# Patient Record
Sex: MALE | Race: BLACK OR AFRICAN AMERICAN | NOT HISPANIC OR LATINO | Employment: OTHER | URBAN - METROPOLITAN AREA
[De-identification: names, ages, dates, MRNs, and addresses within clinical notes are randomized per-mention and may not be internally consistent; named-entity substitution may affect disease eponyms.]

---

## 2022-02-14 ENCOUNTER — OFFICE VISIT (OUTPATIENT)
Dept: CARDIOLOGY | Facility: CLINIC | Age: 72
End: 2022-02-14
Payer: MEDICARE

## 2022-02-14 VITALS
OXYGEN SATURATION: 98 % | DIASTOLIC BLOOD PRESSURE: 84 MMHG | WEIGHT: 265 LBS | HEIGHT: 69 IN | SYSTOLIC BLOOD PRESSURE: 142 MMHG | HEART RATE: 74 BPM | RESPIRATION RATE: 16 BRPM | BODY MASS INDEX: 39.25 KG/M2

## 2022-02-14 DIAGNOSIS — E78.2 MIXED HYPERLIPIDEMIA: ICD-10-CM

## 2022-02-14 DIAGNOSIS — E66.9 OBESITY (BMI 35.0-39.9 WITHOUT COMORBIDITY): ICD-10-CM

## 2022-02-14 DIAGNOSIS — Z95.5 H/O HEART ARTERY STENT: ICD-10-CM

## 2022-02-14 DIAGNOSIS — I10 HYPERTENSION, UNSPECIFIED TYPE: Primary | ICD-10-CM

## 2022-02-14 PROCEDURE — 99213 OFFICE O/P EST LOW 20 MIN: CPT | Mod: S$GLB,,, | Performed by: GENERAL PRACTICE

## 2022-02-14 PROCEDURE — 93000 EKG 12-LEAD: ICD-10-PCS | Mod: S$GLB,,, | Performed by: GENERAL PRACTICE

## 2022-02-14 PROCEDURE — 93000 ELECTROCARDIOGRAM COMPLETE: CPT | Mod: S$GLB,,, | Performed by: GENERAL PRACTICE

## 2022-02-14 PROCEDURE — 99213 PR OFFICE/OUTPT VISIT, EST, LEVL III, 20-29 MIN: ICD-10-PCS | Mod: S$GLB,,, | Performed by: GENERAL PRACTICE

## 2022-02-14 RX ORDER — ZOLPIDEM TARTRATE 10 MG/1
5-10 TABLET ORAL NIGHTLY
COMMUNITY
Start: 2022-01-29

## 2022-02-14 RX ORDER — TIZANIDINE 4 MG/1
4 TABLET ORAL EVERY 8 HOURS PRN
COMMUNITY
Start: 2021-12-16

## 2022-02-14 RX ORDER — SILDENAFIL 100 MG/1
100 TABLET, FILM COATED ORAL DAILY PRN
COMMUNITY
Start: 2021-10-27

## 2022-02-14 NOTE — PROGRESS NOTES
Subjective:    Patient ID:  Aaron Amaya is a 71 y.o. male who presents for follow-up of   Chief Complaint   Patient presents with    Follow-up       HPI:    The patient is 71-year-old male with a history of coronary artery disease.  He is here for routine follow-up.    [facility] ORDERS - 08/23/2019 2:11 PM CDT    This result has an attachment that is not available.      · There is moderate to severe left ventricular systolic dysfunction.    S/p SUCCESSFUL PCI RCA, CHIP 2.25 BY 32 mm.  CARDIOMYOPATHY OUT OF PROPORTION TO CAD.  LVEF 20%.      Coronary Findings Diagnostic Dominance: Right  Right Coronary Artery: Lesion 1: Mid RCA lesion 99% stenosed. The pre-interventional distal flow is decreased (PATRICIO 2). The lesion was not previously treated.     Intervention  Mid RCA lesion: PCI: The pre-interventional distal flow is decreased (PATRICIO 2). Pre-stent angioplasty was performed using a BALLOON APEX RX 2 X 15 supply. A STENT XIENCE MIKE 2.25MM X 33MM [id number] drug eluting stent was successfully placed. The strut is apposed. The post-interventional distal flow is normal (PATRICIO 3). The intervention was successful. No complications occurred at this lesion. There is a 0% residual stenosis post intervention.     Left Ventricle  There is moderate to severe left ventricular systolic dysfunction. The ejection fraction is 20-25% by visual estimate. There are wall motion abnormalities in the left ventricle.    Wall Motion  The mid anterior, mid inferior, apical anterior and apical inferior segments are hypokinetic. The basilar inferior segment is akinetic.        CV LEFT HEART CATH, CV ANGIOGRAPHY - CORONARY, CV DRUG ELUTING STENT - CORONARY (08/23/2019 11:40 AM CDT)   Performing Organization Address City/State/ZIP Code Phone Number   [facility] ORDERS                Visit Diagnoses  - documented in this encounter    Diagnosis   Status post left heart catheterization - Primary     Stented coronary artery    Postsurgical  percutaneous transluminal coronary angioplasty status     Angina effort    Other and unspecified angina pectoris     Abnormal stress test    Other nonspecific abnormal cardiovascular system function study        He is being followed at the [facility] recently had a stress test and echo is in October or November.  And the results were all negative.  He gets his medications filled at the [facility].  He self checks his blood pressure and it is all in the normal range of occasionally checked.  He is planning to start exercise program get more active.  He basically has no complaints shortness of breath PND orthopnea or chest pain.        Review of patient's allergies indicates:  No Known Allergies    History reviewed. No pertinent past medical history.  History reviewed. No pertinent surgical history.  Social History     Tobacco Use    Smoking status: Former Smoker    Smokeless tobacco: Never Used   Substance Use Topics    Alcohol use: Yes    Drug use: Never     History reviewed. No pertinent family history.     Review of Systems:   Constitution: Negative for diaphoresis and fever.   HEENT: Negative for nosebleeds.    Cardiovascular: Negative for chest pain       No dyspnea on exertion       No leg swelling        No palpitations  Respiratory: Negative for shortness of breath and wheezing.    Hematologic/Lymphatic: Negative for bleeding problem. Does not bruise/bleed easily.   Skin: Negative for color change and rash.   Musculoskeletal: Negative for falls and myalgias.   Gastrointestinal: Negative for hematemesis and hematochezia.   Genitourinary: Negative for hematuria.   Neurological: Negative for dizziness and light-headedness.   Psychiatric/Behavioral: Negative for altered mental status and memory loss.          Objective:        Vitals:    02/14/22 1135   BP: (!) 142/84   Pulse: 74   Resp: 16       No results found for: WBC, HGB, HCT, PLT, CHOL, TRIG, HDL, LDLDIRECT, ALT, AST, NA, K, CL, CREATININE, BUN, CO2,  TSH, PSA, INR, GLUF, HGBA1C, MICROALBUR     ECHOCARDIOGRAM RESULTS  No results found for this or any previous visit.        CURRENT/PREVIOUS VISIT EKG  No results found for this or any previous visit.  No valid procedures specified.   No results found for this or any previous visit.      Physical Exam:  CONSTITUTIONAL: No fever, no chills  HEENT: Normocephalic, atraumatic,pupils reactive to light                 NECK:  No JVD no carotid bruit  CVS: S1S2+, RRR, no murmurs,   LUNGS: Clear  ABDOMEN: Soft, NT, BS+  EXTREMITIES: No cyanosis, edema  : No baca catheter  NEURO: AAO X 3  PSY: Normal affect      Medication List with Changes/Refills   Current Medications    SILDENAFIL (VIAGRA) 100 MG TABLET    Take 100 mg by mouth daily as needed.    TIZANIDINE (ZANAFLEX) 4 MG TABLET    Take 4 mg by mouth every 8 (eight) hours as needed.    ZOLPIDEM (AMBIEN) 10 MG TAB    Take 5-10 mg by mouth nightly.             Assessment:       1. Hypertension, unspecified type    2. H/O heart artery stent    3. Obesity (BMI 35.0-39.9 without comorbidity)         Plan:     Problem List Items Addressed This Visit    None     Visit Diagnoses     Hypertension, unspecified type    -  Primary    Relevant Orders    IN OFFICE EKG 12-LEAD (to Muse)    H/O heart artery stent        Obesity (BMI 35.0-39.9 without comorbidity)            Patient is doing well will request recor.holesterol continue home blood pressure monitoring routine follow-up we need the blood work for ds from the [facility] cleaned echo and the stress test.  We need to identify his home medications from the [facility].  Recommend adding some resistance training to aerobic exercise program 30 minutes 5 times a week. Prevention.      No follow-ups on file.    The patients questions were answered, they verbalized understanding, and agreed with the treatment plan.     BHUPINDER CURRIE MD  [facility]

## 2022-04-19 ENCOUNTER — TELEPHONE (OUTPATIENT)
Dept: CARDIOLOGY | Facility: CLINIC | Age: 72
End: 2022-04-19
Payer: MEDICARE

## 2022-04-19 NOTE — TELEPHONE ENCOUNTER
Spoke with patient on mobile device offered him an appointment with our [place] [facility] in place of our  [place] office. Patient stated we is going to seek other provider services closer to home.